# Patient Record
(demographics unavailable — no encounter records)

---

## 2020-12-26 NOTE — NUR
patient remains a and o x 4
sr
lungs cta/dim bases on ra o2 sats mid 90s
uo low today
 called midday and notified
ivf orders given-ns at 50cc/hr
vs stable
no c/o pain
new iv r h 20 ga sl
accuchecks 250/291/255
ssi increased from low to mod per protocal
 notified
up with sba
r groin site c/d/i
small amt of dry bld unchanged from last shift
to tx to tele at end of shift rm 204

## 2020-12-26 NOTE — NUR
PT TRANSPORTED BY NURSING FROM CATH LAB TO ICU . RECEIVED REPORT
BEDSIDE AND ASSUMED CARE AT 0245. PT DENIES COMPLAINTS OF PAIN. ASSESSMENT
COMPLETED AS CHARTED. DISCUSSED PLAN OF CARE WITH PT, VERBALIZED
UNDERSTANDING. ORIENTATED TO ROOM, CALL LIGHT, FALL POLICY.
ASSESSMENT PER PROTOCOL AND PRN TO R GROIN ACCESS SITE. NO SIGN OF HEMATOMA
THROUGHOUT SHIFT
BED LOCKED IN LOWEST POSITION, CALL LIGHT WITHIN REACH, BED ALARM ON.
NURSING ASSISTED WITH POSITION CHANGING TO ENSURE R LEG REMAINED STRAINGHT.
ROUNDING COMPLETED AND ALL NEEDS MET.

## 2020-12-26 NOTE — EKG
[place] [address]
Phone:  [phone]                     ELECTROCARDIOGRAM REPORT      
_______________________________________________________________________________
 
Name:         OMID NEVAREZ              Room:          [address]    ADM IN 
M.R.#:    [id number]     Account #:     [id number]  
Admission:    20    Attend Phys:   Nilo Mathews MD
Discharge:                Date of Birth: 42  
Date of Service: 20 0009  Report #:      [id number]
        [id number]
_______________________________________________________________________________
THIS REPORT FOR:  //name//                      
 
                         [facility] ED
                                       
Test Date:    2020               Test Time:    00:09:54
Pat Name:     OMID NEVAREZ           Department:   
Patient ID:   [id number]            Room:         [facility]
Gender:       M                        Technician:   FL
:          1942               Requested By: Gasper Mix
Order Number: [id number] MD:   Ty Segundo
                                 Measurements
Intervals                              Axis          
Rate:         57                       P:            18
OK:           139                      QRS:          2
QRSD:         85                       T:            98
QT:           420                                    
QTc:          409                                    
                           Interpretive Statements
Sinus rhythm
Inferior infarct, acute (RCA)
Consider anterior infarct
Probable RV involvement, suggest recording right precordial leads
Compared to ECG 2017 18:49:30
No significant changes
Electronically Signed On 2020 12:23:22 CST by Ty Segundo
[email or web address]
 
 
 
 
 
 
 
 
 
 
 
 
 
 
 
 
 
 
  <ELECTRONICALLY SIGNED>
                                           By: Ty Segundo MD, FACC   
  20     1223
D: 20 0009   _____________________________________
T: 20 0009   Ty Segundo MD, FACC     /EPI

## 2020-12-26 NOTE — EKG
[place] [address]
Phone:  [phone]                     ELECTROCARDIOGRAM REPORT      
_______________________________________________________________________________
 
Name:         CARMENHARSHADKEEGAN MOHANY RIMA              Room:          [address]    ADM IN 
M.R.#:    [id number]     Account #:     [id number]  
Admission:    20    Attend Phys:   Nilo Mathews MD
Discharge:                Date of Birth: 42  
Date of Service: 20 0017  Report #:      [id number]
        [id number]
_______________________________________________________________________________
THIS REPORT FOR:  //name//                      
 
                         [facility] ED
                                       
Test Date:    2020               Test Time:    00:17:09
Pat Name:     OMID NEVAREZ           Department:   
Patient ID:   [id number]            Room:         [address]
Gender:       M                        Technician:   FL
:          1942               Requested By: Nilo Mathews
Order Number: [id number]    Reading MD:   Ty Segundo
                                 Measurements
Intervals                              Axis          
Rate:         61                       P:            21
ID:           141                      QRS:          3
QRSD:         83                       T:            113
QT:           406                                    
QTc:          409                                    
                           Interpretive Statements
Sinus rhythm
Inferior infarct, acute (RCA)
Extensive anterior infarct, old
Lateral leads are also involved
Probable RV involvement, suggest recording right precordial leads
Baseline wander in lead(s) V3
Compared to ECG 2020 00:14:23
No significant changes
Electronically Signed On 2020 12:23:34 CST by Ty Segundo
[email or web address]
 
 
 
 
 
 
 
 
 
 
 
 
 
 
 
 
  <ELECTRONICALLY SIGNED>
                                           By: Ty Segundo MD, FACC   
  20     1223
D: 20   _____________________________________
T: 20   Ty Segundo MD, FAC     /EPI

## 2020-12-26 NOTE — EKG
[place] [address]
Phone:  [phone]                     ELECTROCARDIOGRAM REPORT      
_______________________________________________________________________________
 
Name:         CARMENJDOMID T              Room:          [address]    ADM IN 
M.R.#:    [id number]     Account #:     [id number]  
Admission:    20    Attend Phys:   Nilo Mathews MD
Discharge:                Date of Birth: 42  
Date of Service: 20 0014  Report #:      [id number]
        [id number]
_______________________________________________________________________________
THIS REPORT FOR:  //name//                      
 
                         [facility] ED
                                       
Test Date:    2020               Test Time:    00:14:23
Pat Name:     OMID NEVAREZ           Department:   
Patient ID:   [id number]            Room:         [address]
Gender:       M                        Technician:   FL
:          1942               Requested By: Nilo Mathews
Order Number: [id number]    Reading MD:   Ty Segundo
                                 Measurements
Intervals                              Axis          
Rate:         61                       P:            16
FL:           165                      QRS:          0
QRSD:         89                       T:            103
QT:           413                                    
QTc:          416                                    
                           Interpretive Statements
Sinus rhythm
Inferior infarct, acute (RCA)
Consider anterior infarct
Lateral leads are also involved
Probable RV involvement, suggest recording right precordial leads
Compared to ECG 2020 00:09:54
No significant changes
Electronically Signed On 2020 12:23:26 CST by Ty Segundo
[email or web address]
 
 
 
 
 
 
 
 
 
 
 
 
 
 
 
 
 
  <ELECTRONICALLY SIGNED>
                                           By: Ty Segundo MD, FACC   
  20     1223
D: 20 0014   _____________________________________
T: 20 0014   Ty Segundo MD, FACC     /EPI
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
## 2020-12-27 NOTE — NUR
PT. AOX4, R GROIN CATH SITE CDI, NO HEMATOMA NOTED. SR ON MONITOR, VSS, DENIES
PAIN. CALL LIGHT AND PERSONAL BELONGINGS PLACED WITHIN REACH.

## 2020-12-27 NOTE — NUR
No acute event this shift. Pt denies pain. R groin cath site intact.
Assessment as charted, meds given per mar. Call light within reach, safety
precaution in placed.

## 2020-12-28 NOTE — EKG
[place] [address]
Phone:  [phone]                     ELECTROCARDIOGRAM REPORT      
_______________________________________________________________________________
 
Name:         OMID NEVAREZ              Room:          [address]    ADM IN 
M.R.#:    [id number]     Account #:     [id number]  
Admission:    20    Attend Phys:   Nilo Mathews MD
Discharge:                Date of Birth: 42  
Date of Service: 20 [facility]  Report #:      [id number]
        [id number]
_______________________________________________________________________________
THIS REPORT FOR:  //name//                      
 
                          [facility]
                                       
Test Date:    2020               Test Time:    10:38:53
Pat Name:     OMID NEVAREZ           Department:   
Patient ID:   [id number]            Room:         [address]
Gender:       M                        Technician:   JEN
:          1942               Requested By: Nilo Mathews
Order Number: [id number]    Reading MD:   Cisco Waldron
                                 Measurements
Intervals                              Axis          
Rate:         66                       P:            
WA:                                    QRS:          -12
QRSD:         90                       T:            122
QT:           409                                    
QTc:          429                                    
                           Interpretive Statements
Probable sinus rhythm
Inferior infarct, acute (RCA)
Anterior infarct, old
Probable RV involvement, suggest recording right precordial leads
Missing lead(s): V6
Compared to ECG 2020 05:15:50
Myocardial infarct finding still present
Electronically Signed On 2020 15:07:28 CST by Cisco Waldron
[email or web address]
 
 
 
 
 
 
 
 
 
 
 
 
 
 
 
 
 
  <ELECTRONICALLY SIGNED>
                                           By: Cisco Waldron MD, FACC     
  20     1507
D: 20 1038   _____________________________________
T: 20 1038   Cisco Waldron MD, FACC       /EPI

## 2020-12-28 NOTE — NUR
I ASSUMED CARE OF THE PATIENT AT 0700.  HE IS ALERT AND ORIENTED X4 AND IS UP
WITH ASSIST OF 1 AND A GAIT BELT AND A CANE.  HIS CATH SITE IS C/D/I.  HE HAS
BATHROOM RIGHTS AND BLOOD GLUCOSE IS BEING MONITORED.  HE IS CONCERNED ABOUT
HOW TO CHECK HIS SUGAR AT HOME AND WE DISCUSSED HOME SERVICES.  HIS LABS ARE
TRENDING TOWARDS HIS GOALS.  HE WAS GIVEN SOME MEDS TO ASSIST WITH A BOWEL
MOVEMENT, ITS BEEN 4 DAYS.  HE WAS DISCHARGED TO HOME AFTER HIS IV WAS D/C'D
WITH HIS DAUGHTER AT 1530.  SCRIPTS WERE SENT TO [facility].  HE
UNDERSTANDS HIS UPCOMINGS FOLLOW UP APPOINTMENTS.  DISCHARGE WAS EXPLAINED TO
DAUGHTER AND PATIENT.

## 2020-12-28 NOTE — EKG
[place] [address]
Phone:  [phone]                     ELECTROCARDIOGRAM REPORT      
_______________________________________________________________________________
 
Name:         OMID NEVAREZ              Room:          [address]    ADM IN 
M.R.#:    [id number]     Account #:     [id number]  
Admission:    20    Attend Phys:   Nilo Mathews MD
Discharge:                Date of Birth: 42  
Date of Service: 20 0515  Report #:      [id number]
        [id number]
_______________________________________________________________________________
THIS REPORT FOR:  //name//                      
 
                          [facility]
                                       
Test Date:    2020               Test Time:    05:15:50
Pat Name:     OMID NEVAREZ           Department:   
Patient ID:   [id number]            Room:         [address]
Gender:       M                        Technician:   RB
:          1942               Requested By: Nilo Mathews
Order Number: [id number]    Reading MD:   Cisco Waldron
                                 Measurements
Intervals                              Axis          
Rate:         64                       P:            48
WA:           171                      QRS:          -7
QRSD:         88                       T:            119
QT:           426                                    
QTc:          440                                    
                           Interpretive Statements
Sinus rhythm
Inferior infarct, acute (RCA)
Anterior infarct, old
Probable RV involvement, suggest recording right precordial leads
Compared to ECG 2020 05:14:00
No significant changes
Electronically Signed On 2020 15:02:44 CST by Cisco Waldron
[email or web address]
 
 
 
 
 
 
 
 
 
 
 
 
 
 
 
 
 
 
  <ELECTRONICALLY SIGNED>
                                           By: Cisco Waldron MD, FACC     
  20     1502
D: 20   _____________________________________
T: 2015   Cisco Waldron MD, FACC       /EPI

## 2020-12-28 NOTE — EKG
[place] [address]
Phone:  [phone]                     ELECTROCARDIOGRAM REPORT      
_______________________________________________________________________________
 
Name:         OMID NEVAREZ              Room:          [address]    ADM IN 
M.R.#:    [id number]     Account #:     [id number]  
Admission:    20    Attend Phys:   Nilo Mathews MD
Discharge:                Date of Birth: 42  
Date of Service: 20 0514  Report #:      [id number]
        [id number]
_______________________________________________________________________________
THIS REPORT FOR:  //name//                      
 
                          [facility]
                                       
Test Date:    2020               Test Time:    05:14:00
Pat Name:     OMID NEVAREZ           Department:   
Patient ID:   [id number]            Room:         [address]
Gender:       M                        Technician:   RB
:          1942               Requested By: Nilo Mathews
Order Number: [id number]    Reading MD:   Cisco Waldron
                                 Measurements
Intervals                              Axis          
Rate:         67                       P:            19
GA:           164                      QRS:          -4
QRSD:         88                       T:            115
QT:           426                                    
QTc:          450                                    
                           Interpretive Statements
Sinus rhythm
Inferior infarct, acute (RCA)
Anterior infarct, old
Probable RV involvement, suggest recording right precordial leads
Compared to ECG 2020 00:17:09
No significant changes
Electronically Signed On 2020 15:02:38 CST by Cisco Waldron
[email or web address]
 
 
 
 
 
 
 
 
 
 
 
 
 
 
 
 
 
 
  <ELECTRONICALLY SIGNED>
                                           By: Cisco Waldron MD, FACC     
  20     1502
D: 2014   _____________________________________
T: 20 0514   Cisco Waldron MD, FACC       /EPI

## 2020-12-30 NOTE — NUR
CM received call from Pt today, requesting that HH be arranged. Pt opted to
not have HH when he dc on Monday. The only HH agency that services his zip
code and accepts his insurance is [facility] and they do not have the staff to
service his area at this time. CM contacted Pt and let him know, Pt plans to
call and schedule an appt with [facility] instead.

## 2021-01-21 NOTE — NUR
CM SPOKE TO THE PT TO DISCUSS CM ASSESSMENT. PT A&O, NORMALLY INDEPENDENT
WITH ADL'S, AND DRIVES. PT RESIDES AT HOME WITH SPOUSE. PT USES A WALKER OR
CANE FOR MOBILITY. PT HAS PAST HX OF HH. PT HAS 0 HX OF SNF. PT CURRENTLY ON
2L O2, BUT DID NOT USE HOME 02 PRIOR TO ADMIT. CM WILL REMAIN AVAILABLE TO
ASSIST AND FOLLOW FOR D/C PLANNING.

## 2021-01-21 NOTE — NUR
PATIENT SLEPT WELL AFTER RECEIVING LASIX AND PLACED ON O2 AT 2LITERS.  PT
ENCOURAGED TO REPOSITION BUT HE SAID HE IS ABLE TO TURN HIMSELF.  PT DENIES
PAIN/NAUSEA.  PT AFIB ON CARDIAC MONITOR.  FREQUENTLY USED ITEMS AND CALL
LIGHT WITHIN REACH.  SIDERAILS UPX2  WILL CONTINUE TO  MONITOR.

## 2021-01-21 NOTE — NUR
I ASSUMED care of the patient AT 0700.  HE IS ALERT AND ORIENTED X4 AND IS UP
WITH ASSIST OF ONE.  BED IS IN THE LOW LOCKED POSITION AND CALL LIGHT IS IN
REACH.  HOURLY ROUNDING IS COMPLETED AND PATIENT NEEDS ARE MET.  PAIN IS
DENIED.  SLIDING SCALE INSULIN WAS ORDERED.  THORACENTESIS WAS PREFORMED AND
ULTRASOUND SAID THAT ALL LABS WERE SENT TO PATHOLOGY.  COVID PCR WAS ALSO
COLLECTED AND SENT OFF.  HE IS SCHEDULED FOR A CARDIAC CATH TOMORROW.  WILL
CONTINUE TO MONITOR.

## 2021-01-21 NOTE — EKG
[place] [address]
Phone:  [phone]                     ELECTROCARDIOGRAM REPORT      
_______________________________________________________________________________
 
Name:         OMID NEVAREZ              Room:          [address]    ADM IN 
M.R.#:    [id number]     Account #:     [id number]  
Admission:    21    Attend Phys:   Shabbir Escobar, 
Discharge:                Date of Birth: 42  
Date of Service: 21 0424  Report #:      [id number]
        [id number]
_______________________________________________________________________________
THIS REPORT FOR:  //name//                      
 
                          [facility]
                                       
Test Date:    2021               Test Time:    04:24:41
Pat Name:     OMID NEVAREZ           Department:   
Patient ID:   [id number]            Room:         [address]
Gender:       M                        Technician:   UNKNOWN
:          1942               Requested By: Nilo Mathews
Order Number: [id number]    Reading MD:   Nilo Mathews
                                 Measurements
Intervals                              Axis          
Rate:         99                       P:            
MT:                                    QRS:          -15
QRSD:         91                       T:            -82
QT:           378                                    
QTc:          486                                    
                           Interpretive Statements
Atrial fibrillation
 
Inferior infarct, age indeterminate
Anterior infarct, old
Lateral leads are also involved
Compared to ECG 2021 16:45:36
No significant changes
Electronically Signed On 2021 11:20:54 CST by Nilo Mathews
[email or web address]
 
 
 
 
 
 
 
 
 
 
 
 
 
 
 
 
 
  <ELECTRONICALLY SIGNED>
                                           By: Nilo Mathews MD, [facility]      
  21     1120
D: 21 0424   _____________________________________
T: 21 0424   Nilo Mathews MD, [facility]        /EPI

## 2021-01-21 NOTE — E[place] [address]
Phone:  [phone]                     ELECTROCARDIOGRAM REPORT      
_______________________________________________________________________________
 
Name:         OMID NEVAREZ              Room:          [address]    ADM IN 
M.R.#:    [id number]     Account #:     [id number]  
Admission:    21    Attend Phys:   Shabbir Escobar, 
Discharge:                Date of Birth: 42  
Date of Service: 21 1645  Report #:      [id number]
        [id number]
_______________________________________________________________________________
THIS REPORT FOR:  //name//                      
 
                         [facility] ED
                                       
Test Date:    2021               Test Time:    16:45:36
Pat Name:     OMID NEVAREZ           Department:   
Patient ID:   [id number]            Room:         [facility]
Gender:       M                        Technician:   
:          1942               Requested By: Gasper Mix
Order Number: [id number] MD:   Nilo Mathews
                                 Measurements
Intervals                              Axis          
Rate:         90                       P:            
IL:                                    QRS:          -20
QRSD:         91                       T:            -70
QT:           396                                    
QTc:          485                                    
                           Interpretive Statements
Atrial fibrillation
nonspecific t wave changes
Inferior infarct, age indeterminate
Probable anterior infarct, age indeterminate
Compared to ECG 2020 10:38:53
atrial fibrillation now present
Myocardial infarct finding still present
Electronically Signed On 2021 11:17:52 CST by Nilo Mathews
[email or web address]
 
 
 
 
 
 
 
 
 
 
 
 
 
 
 
 
 
  <ELECTRONICALLY SIGNED>
                                           By: Nilo Mathews MD, FACC      
  21     1117
D: 21 1645   _____________________________________
T: 21 1645   Nilo Mathews MD, FAC        /EPI

## 2021-01-22 NOTE — NUR
PT ROUNDING POC UDPATE: PT HAD HEART CATH PLACED TODAY, WILL REMAIN
HOSPITALIZED FOR OBSERVATION.
 
PT WIFE ASKED IF PT WOULD QUALIFY FOR ARU. "I WANT HIM TO GAIN MORE STRENGTH."
RN TO PUT IN ORDER FOR ARU.

## 2021-01-22 NOTE — NUR
CALL FROM [facility].SUPERVISOR AT 9252 TO TRANSFER PT. FAMILY CHOSE [facility] ON THE
[facility]. CM CALLED [facility] TRANSFER TEAM/FABIAN [phone]. GAVE HER
INFORMATION  CELL PHONE NUMBER.  FAXED FACE SHEET TO HER [phone]
(NO INSURANCE CARDS ON FILE). SPOKE WITH [employer] TECH AND ASKED HIM TO UPLOAD
IMAGES TO [facility]. SPOKE WITH JADE/ICU. INFORMED HER OF ABOVE
INFORMATION. SHE SAID WIFE WAS HERE WITH PT. PRACHI,US COPIED CHART AND IS
STARTING AMBULANCE FORM AND EMTALA FORM.

## 2021-01-22 NOTE — NUR
PATIENT ARRIVED TO ICU-8 AT 1725 WITH FLOOR NURSE AND CT TECH AT BEDSIDE.
PATIENT WAS FOUND TO HAVE INCREASED CONFUSION AND SLURRED SPEECH. CT AND RI OF
HEAD SHOWS A BLEED. DR FRAZIER IS AWARE AND HAS BEEN AT BEDSIDE. ORDERS RECIVED
TO COMPLETE FREQUENT NEURO CHECKS AND NOT WORRY ABOUT [facility] AS DR FRAZIER STATES
THAT IT WILL JUST UPSET THE PATIENT AND THE MOST IMPORTANT THING IS HIS NEURO
STATUS. [facility] ATTEMPTED BUT PATIENT WAS UNABLE TO FOLLOW DIRECTIONS. BLOOD
PRESSURE STABLE ON CARDENE. NO FURTHER CONCERNS AT THIS TIME. WILL CONTINUE TO
MONITOR AND CAR PER PLAN OF CARE.

## 2021-01-22 NOTE — EKG
[place] [address]
Phone:  [phone]                     ELECTROCARDIOGRAM REPORT      
_______________________________________________________________________________
 
Name:         OMID NEVAREZ              Room:          [address]    ADM IN 
M.R.#:    [id number]     Account #:     [id number]  
Admission:    21    Attend Phys:   Shabbir Escobar, 
Discharge:                Date of Birth: 42  
Date of Service: 21 0808  Report #:      [id number]
        [id number]
_______________________________________________________________________________
THIS REPORT FOR:  //name//                      
 
                          [facility]
                                       
Test Date:    2021               Test Time:    08:08:37
Pat Name:     OMID NEVAREZ           Department:   
Patient ID:   [id number]            Room:         [address]
Gender:       M                        Technician:   
:          1942               Requested By: Fanny Allen
Order Number: [id number]    Reading MD:   Nilo Mathews
                                 Measurements
Intervals                              Axis          
Rate:         62                       P:            49
MI:           169                      QRS:          -10
QRSD:         85                       T:            -74
QT:           517                                    
QTc:          525                                    
                           Interpretive Statements
Sinus rhythm
Inferior infarct, age indeterminate
Anterior infarct, old
Lateral leads are also involved
Prolonged QT interval
Compared to ECG 2021 04:24:41
Prolonged QT interval now present
Atrial fibrillation no longer present
Myocardial infarct finding still present
Electronically Signed On 2021 14:41:39 CST by Nilo Mathews
[email or web address]
 
 
 
 
 
 
 
 
 
 
 
 
 
 
 
  <ELECTRONICALLY SIGNED>
                                           By: Nilo Mathews MD, [facility]      
  21     1441
D: 21   _____________________________________
T: 21 08   Nilo Mathews MD, [facility]        /EPI

## 2021-01-22 NOTE — NUR
ASSUMED PT CARE AT 0730, PT AOX4 BUT FLAT, NO C/O PAIN OR SHORTNESS OF BREATH.
PT WENT DOWN FOR CARDIAC CATH AT APPROX 0820 AND CAME BACK UP AT APPROX 1050.
PT SLEEPY BUT ORIENTED AT THIS POINT AND WOKE UP PERIODICALLY TO ASK FOR
DRINKS OF WATER AND FOOD. UPON COMING INTO PT ROOM AT APPROX 1540, PT SLURRING
WORDS AND IS UNINTELLIGABLY SPEAKING AND TRYING TO GET OUT OF BED, [facility]
ATTEMPTED AT THIS TIME BUT PT UNCOOPERATIVE, PT MOVING ARMS AND UPPER BODY BUT
WEAK, NO FACIAL DROOP NOTED, DR REID ON THE FLOOR AND ASKED TO COME SEE
PT, SUGGESTED POSSIBLE STROKE, CODE STROKE CALLED, DR BROOKS AND DR SANCHEZ BOTH
PAGED. PT WENT DOWN FOR CT, SEE RESULTS AND THEN FOR MRI, SEE RESULTS.
POSSIBLE BRAIN BLEED NOTED, NEURO WORKED W/ PT AND PT SENT TO ICU AT APPROX
1725. PT WIFE NOTIFIED AT THIS TIME OF UPDATE AND WALKED DOWN TO ICU WAITING
ROOM. MEDS PER MAR, HOURLY ROUNDING OBSERVED, FALL PRECAUTIONS IN PLACE, CALL
LIGHT W/IN REACH.

## 2021-01-22 NOTE — NUR
PT SLEPT FAIRLY WELL OVERNIGHT, USING URINAL INDEP AT BEDSIDE WITH STRICT I&O
RECORDED. AOX4, FLAT AFFECT, IRRITABLE AT TIMES. O2 2L NC. TELE SR. HS
ACCUCHECK 183, REFUSING INSULIN. NPO SINCE MIDNIGHT FOR CARDIAC CATH TODAY.
VSS. ABLE TO USE CALL LITE AND MAKE NEEDS KNOWN. NO SIGNS BLEEDING OR DRAINAGE
FROM L BACK THORACENTESIS SITE. LAC SL, ABX GIVEN AS ORDERED.

## 2021-01-23 NOTE — NUR
PERFORMED BEDSIDE SWALLOW EVALUATION AND PT ABLE TO SWALLOW PILLS. PT WILL
HAVE ST DO EVALUATION AS WELL.

## 2021-01-23 NOTE — NUR
ASSESSMENTS AS CHARTED. PATIENT INTERMITTENTLY AGITATED WHEN TOLD HE CANNOT
GET OUT OF BED AND WANDER AROUND. PATIENT IS CONFUSED, ORINETED TO SELF ONLY.
PATIENT WAS COMBATIVE AROUND MIDNIGHT AFTER ATTEMPTING TO CLIMB OUT OF BED.
HEAD OF BED AT 30 DEGREES OR GREATER. CARDENE INFUSING TO KEEP SBP AROUND 140.

## 2021-01-23 NOTE — NUR
ATTEMPTED BEDSIDE SWALLOW ASSESSMENT. PATIENT COUGHING WHEN ATTEMPTING TO
SWALLOW PUDDING. CONSULT FOR SPEECH THERAPY EVAL PUT IN PER PROTOCOL.

## 2021-01-24 NOTE — NUR
DR. BROOKS INSTRUCTS FOR PT TO STAY IN ICU FOR TODAY. PT'S BP REMAINS ELEVATED
AND IS NOW IN CONTROLLED AFIB. START CARDENE GTT AT LOW DOSE AND WILL TITRATED
TO KEEP SBP LESS THAN 140. WILL CONTINUE TO ASSESS.

## 2021-01-25 NOTE — NUR
PATIENT TRANSFERRED FROM ICU THIS AFTERNOON. REPORT RECEIVED FROM TAD ZAMUDIO.
02 2L NC IN PLACE. BP ELEVATED BUT OTHERWISE STABLE. CARDIAC MONITOR IN PLACE.
NO COMPLAINTS OF PAIN. SCHED IV ABX INFUSING. WIFE AT BEDSIDE.

## 2021-01-25 NOTE — 2DMMODE
[place] [address]
Phone:  [phone] 2 D/M-MODE ECHOCARDIOGRAM     
_______________________________________________________________________________
 
Name:         ROQUEOMID T              Room:          [address] [place] 
LUISA.#:    [id number]     Account #:     [id number]  
Admission:    21    Attend Phys:   Shabbir Escobar, 
Discharge:                Date of Birth: 42  
Date of Service: 21 1440  Report #:      [id number]
        [id number]
_______________________________________________________________________________
THIS REPORT FOR:
 
cc:  Aleksander Corcoran MD, Rene P. MD Holkins,Cisco MENDOZA MD [facility]       
                                                                       ~
 
--------------- APPROVED REPORT --------------
 
 
Study performed:  2021 09:52:54
 
EXAM: Comprehensive 2D, Doppler, and color-flow 
Echocardiogram 
Patient Location: In-Patient   
Room #:  001     Status:  routine
 
      BSA:         2.13
HR: 57 bpm BP:          158/87 mmHg 
Rhythm: NSR     
 
Other Information 
Study Quality: Good
 
Indications
CVA/TIA 
CAD
 
Echo Enhancing Agent
Indication: Rule out Shunt
Agent(s) / Amount(s) Used: Agitated Saline 10 cc
 
2D Dimensions
IVSd:  14.15 (7-11mm) LVOT Diam:  21.75 (18-24mm) 
LVDd:  48.84 mm  
PWd:  12.09 (7-11mm) Ascending Ao:  33.20 (22-36mm)
LVDs:  39.05 (25-40mm) 
Aortic Root:  33.21 mm 
 
Volumes
Left Atrial Volume (Systole) 
    LA ESV Index:  39.10 mL/m2
 
Aortic Valve
AoV Peak Yuri.:  1.33 m/s 
AO Peak Gr.:  7.03 mmHg  LVOT Max P.93 mmHg
 
 
[address] [address]
Phone:  [phone]                     2 D/M-MODE ECHOCARDIOGRAM     
_______________________________________________________________________________
 
Name:         OMID NEVAREZ              Room:          [address]#:    [id number]     Account #:     [id number]  
Admission:    21    Attend Phys:   Shabbir Escobar, 
Discharge:                Date of Birth: 42  
Date of Service: 21 1440  Report #:      [id number]
        [id number]
_______________________________________________________________________________
AO Mean Gr.:  4.05 mmHg  LVOT Mean P.42 mmHg
    LVOT Max V:  0.86 m/s
AO V2 VTI:  28.63 cm  LVOT Mean V:  0.55 m/s
MAURILIO (VTI):  2.28 cm2  LVOT V1 VTI:  17.59 cm
 
Mitral Valve
    E/A Ratio:  1.11
    MV Decel. Time:  196.83 ms
MV E Max Yuri.:  0.75 m/s 
MV PHT:  57.08 ms  
MVA (PHT):  3.85 cm2  
 
TDI
E/Lateral E':  10.71 E/Medial E':  12.50
   Medial E' Yuri.:  0.06 m/s
   Lateral E' Yuri.:  0.07 m/s
 
Pulmonary Valve
PV Peak Yuri.:  0.86 m/s PV Peak Gr.:  2.95 mmHg
 
Tricuspid Valve
    RAP Estimate:  5.00 mmHg
TR Peak Gr.:  28.40 mmHg RVSP:  33.00 mmHg
    PA Pressure:  33.00 mmHg
 
Left Ventricle
The left ventricle is normal size. There is normal LV segmental wall 
motion. Mild to moderate concentric left ventricular hypertrophy. 
Left ventricular systolic function is mildly decreased. LVEF is 
45-50%. The left ventricular diastolic function is normal.
 
Right Ventricle
The right ventricle is normal size. The right ventricular systolic 
function is normal.
 
Atria
Left atrium is mildly dilated. The interatrial septum is intact with 
no evidence for an atrial septal defect. The right atrium size is 
normal.
 
Aortic Valve
Mild aortic valve sclerosis. No aortic regurgitation is present. 
There is no aortic valvular stenosis.
 
Mitral Valve
Mild mitral annular calcification. [place] [address]
Phone:  [phone]                     2 D/M-MODE ECHOCARDIOGRAM     
_______________________________________________________________________________
 
Name:         OMID NEVAREZ              Room:          [address] [place] 
[facility]#:    [id number]     Account #:     [id number]  
Admission:    21    Attend Phys:   Shabbir Escobar, 
Discharge:                Date of Birth: 42  
Date of Service: 21 1440  Report #:      [id number]
        [id number]
_______________________________________________________________________________
evidence of mitral valve stenosis.
 
Tricuspid Valve
The tricuspid valve is normal in structure. Mild tricuspid 
regurgitation. Mild pulmonary hypertension.
 
Pulmonic Valve
The pulmonary valve is normal in structure. There is no pulmonic 
valvular regurgitation.
 
Great Vessels
The aortic root is normal in size. IVC is normal in size and 
collapses >50% with inspiration.
 
Pericardium
There is no pericardial effusion. Left pleural effusion is 
noted.
 
<Conclusion>
The left ventricle is normal size.
Mild to moderate concentric left ventricular hypertrophy.
Left ventricular systolic function is mildly decreased.
LVEF is 45-50%.
The right ventricle is normal size.
Left atrium is mildly dilated.
The right atrium size is normal.
Mild aortic valve sclerosis.
No aortic regurgitation is present.
There is no aortic valvular stenosis.
Mild mitral annular calcification.
Mild mitral regurgitation.
No evidence of mitral valve stenosis.
The tricuspid valve is normal in structure.
Mild tricuspid regurgitation.
Mild pulmonary hypertension.
IVC is normal in size and collapses >50% with inspiration.
There is no pericardial effusion.
There is normal LV segmental wall motion.
The interatrial septum is intact with no evidence for an atrial 
septal defect.
Left pleural effusion is noted.
 
 
 
  <ELECTRONICALLY SIGNED>
                                           By: Cisco Waldron MD, FACC     
  21     1440
D: 21 1440   _____________________________________
T: 21 1440   Cisco Walrdon MD, FACC       /INF

## 2021-01-25 NOTE — EKG
[place] [address]
Phone:  [phone]                     ELECTROCARDIOGRAM REPORT      
_______________________________________________________________________________
 
Name:         OMID NEVAREZ              Room:          [address] [place] 
M.R.#:    [id number]     Account #:     [id number]  
Admission:    21    Attend Phys:   Shabbir Escobar, 
Discharge:                Date of Birth: 42  
Date of Service: 2127  Report #:      [id number]
        [id number]
_______________________________________________________________________________
THIS REPORT FOR:  //name//                      
 
                          [facility]
                                       
Test Date:    2021               Test Time:    08:27:57
Pat Name:     OMID NEVAREZ           Department:   
Patient ID:   [id number]            Room:         [facility]
Gender:       M                        Technician:   
:          1942               Requested By: Nilo Mathews
Order Number: [id number]    Reading MD:   Ty Segundo
                                 Measurements
Intervals                              Axis          
Rate:         59                       P:            0
IN:           176                      QRS:          10
QRSD:         80                       T:            103
QT:           692                                    
QTc:          686                                    
                           Interpretive Statements
Sinus rhythm
Anterior infarct, old
Nonspecific T abnormalities, lateral leads
Prolonged QT interval
Compared to ECG 2021 08:08:37
T-wave abnormality now present
Myocardial infarct finding still present
Electronically Signed On 2021 15:13:18 CST by Ty Segundo
[email or web address]
 
 
 
 
 
 
 
 
 
 
 
 
 
 
 
 
 
  <ELECTRONICALLY SIGNED>
                                           By: Ty Segundo MD, FACC   
  21     1513
D: 21   _____________________________________
T: 21   Ty Segundo MD, FAC     /EPI

## 2021-01-25 NOTE — NUR
STOPPED CARDEM DRIP AFTER HS BP MEDS GIVEN. PRESSURE WAS 'S THEN 130 OR
LESS SYS WHILE ASLEEP. PT DID TAKE OFF O2 A COUPLE TIMES. O2 DESAT TO
MID/UPPER 80'S. QUICKLY RECOVERS TO UPPER 90'S WITH 2L O2 PER NC. TRACING SB
UPPER 50'S ON MONITOR SHILE ASLEEP.  RIGHT GROING DRESSING INTACT WITH OLD
DRAINAGE. NO REPORTS OF PAIN THIS SHIFT.

## 2021-01-26 NOTE — NUR
RECEIVED REPORT. ASSUMED CARE OF PT AROUND 0730. AM ASSESSMENT AND VITALS
COMPLETED AS CHARTED. MEDS PER EMAR. WIFE, PT AND DR DUNN HAD A DISCUSSION
ABOUT POC THIS AM. PT REFUSING TREATMENTS AT TIMES. HERMAN IN PLACE TO DD. UP
TO BEDSIDE CHAIR WITH ASSISTANCE. CARDIAC MONITOR IN PLACE. FALL PRECAUTIONS
IN PLACE. HOURLY ROUNDING PERFORMED. CALL LIGHT WITHIN REACH.

## 2021-01-26 NOTE — NUR
PT ROUNDING POC UPDATE: PT RECENTLY TRANSFERED TO TELE FROM ICU. PT IS ON 2L
OF 02 NC. CM HAVE DISCUSSION AT LENGTH REGARDING POST DISCHARGE PLAN. WIFE
STRONGLY HESITANT ABOUT PT GOING TO SNF AND IS WANTING ARU. PER NASH,
CONSULT HAS BEEN PLACED. PT WIFE IS HOME 24/7 TO PROVIDE SUPERVISION AND CARE.
PT HAS AN SON, BUT IS WOULD NOT BE AVAIL TO "COME OVER TO HELP
EVERYDAY/KAY, WIFE. CM TO CONT TO FOLLOW.

## 2021-01-26 NOTE — PATH
[address]  [address]                    PATHOLOGY RPT PROCEDURE       
_______________________________________________________________________________
 
Name:       OMID NEVAREZ               Room:           [address] [place]  
[facility]#:  [id number]      Account #:      [id number]  
Admission:  01/20/21     Date of Birth:  12/05/42  
Discharge:                             Report #:    [id number]
                                                         Path Case #: [id number]
_______________________________________________________________________________
Note
[facility] Accession Number: [id number]
   TESTS               RESULT  FLAG  UNITS    REF RANGE  LAB
------------------------------------------------------------
   Clinician Provided Cytology Information
   No. of containers..01 Other (Miscellaneous)
Source:                                                   01
   LT PLEURAL FLUID
DIAGNOSIS:                                                02
   LT PLEURAL FLUID
   NEGATIVE FOR MALIGNANT CELLS.
   FEW MESOTHELIAL CELLS AND INFLAMMATORY CELLS.
   THIS INTERPRETATION INCLUDES EVALUATION OF A CELL BLOCK.
Signed out by:                                            02
   Galindo [place] MD, Pathologist
   NPI- [id number]
Performed by:                                             01
   July Mathew, Cytotechnologist ([facility])
Gross description:                                        01
   18ML, YELLOW, 1TP 1CB
   /LCS  01/22/2021  0731 Local
 
------------------------------------------------------------
    FLAG LEGEND:
    L-Low Normal,H-High Normal,LL-Alert Low,HH-Alert High
    <-Panic Low,>-Panic High,A-Abnormal,AA-Critical Abnormal
------------------------------------------------------------
 
Performed at:
[address]
   [place]  [address]
   Balwinder Wilkes MD, Phone: [phone]
[address] [place] [address]
   [address] [place]  [address]
   Galindo Dixon MD, Phone: [phone]
Specimen Comment: A courtesy copy of this report has been sent to [phone]
[phone]
Specimen Comment: [phone], [phone]
Specimen Comment: Report sent to DR FARFAN,DR BROOKS / DR BURKETT
***Performed at:  01
   [address], [place], [place]  [id number]
   MD Balwinder Wilkes MD Phone:  [phone]

## 2021-01-26 NOTE — NUR
TRIED TO GIVE PT MED FOR ELEVATED BP AND SCHEDULED MED AS WELL. PT STATED "I
DON'T WANT ANY MORE MEDS UNTIL I TALK TO THE DOCTOR". MEDS NOT GIVEN.

## 2021-01-27 NOTE — CON
[address]  [address]                    CONSULTATION                  
_______________________________________________________________________________
 
Name:       OMID NEVAREZ               Room:           [address] [place]  
M.R.#:  [id number]      Account #:      [id number]  
Admission:  01/20/21     Attend Phys:    Shabbir Escobar MD 
Discharge:               Date of Birth:  12/05/42  
         Report #: [id number]
                                                                     [id number]  
_______________________________________________________________________________
THIS REPORT FOR:  
 
cc:  Aleksander Corcoran MD, Rene P. MD                                                   ~
     Yimi Hyatt MD          
 
 
HISTORY OF PRESENT ILLNESS:  This is a 78-year-old male patient who was
evaluated by me for the possibility of stroke.  Initially, I talked to Dr. Mathews
and subsequently I had more discussions with him after the CT and MRI report was
available.  I talked to Dr. Ren who is the hospitalist, who is making rounds
on this patient.  I talked to the nurse who was looking at this patient on the
floor and I will talk to the nurse who is in ICU and presently in the report,
but I will talk to her before I leave ICU.  Because of time sensitive and
acuteness of the patient's problem, this note is being dictated and we will
supplement it with later on and its a concise note.
 
This patient went for cardiac catheterization and had a stent put in.  He was
apparently doing okay after the stent, but then the nurses noticed about 45
minutes prior to activating the code stroke that the patient was confused, he
was not talking and he was saying only yes and no.  They said they noticed
right-sided weakness, but I am not certain about that.  I had multiple
discussions with the patient's wife after and before seeing the patient because
the patient was in CT.
 
As I understand from Dr. Mathews, the patient has been on Plavix and he also was
noticed to be in atrial fibrillation, but he is not on anticoagulation at the
moment.  He does have a history of heart failure, hypertension, apparently TIA,
but I am not sure about that history.  He also has a history of diabetes and
kidney stones.
 
REVIEW OF SYSTEMS:  I reviewed those notes and in fact I had seen this patient
at that time in 2017.  Rest of the review of systems will be carried out later
on, although I glanced through the records I could.
 
PAST MEDICAL HISTORY:  Positive for meningitis and it does look like he did have
meningitis at that time.  When he was admitted this time, his renal function was
abnormal.  His creatinine still is 1.7 and that is where it has been running. 
He got contrast during his procedure today.  His examination was limited.  He
basically said that yes or no.  He will not follow any commands for me, but I
saw him moving his right arm as well as the left arm.  He would not do anything
on commands.  He had become confused; therefore examination was difficult as he
had gone down for CT as a CT stroke protocol.  
 
Me and Dr. Mathews discussed what the best procedure to do on him is.  If I
understood correctly, the patient was still within TPA window, so we evaluated
 
 
 
[place] [address]                    CONSULTATION                  
_______________________________________________________________________________
 
Name:       ROQUEOMID T               Room:           [address] [place]  
.R.#:  [id number]      Account #:      [id number]  
Admission:  01/20/21     Attend Phys:    Shabbir Escobar MD 
Discharge:               Date of Birth:  12/05/42  
         Report #: [id number]
                                                                     [id number]  
_______________________________________________________________________________
 
 
him for that.  We were considering whether to do a CT angio, but his kidney
function is not very well and he will need contrast and if he has an embolus,
then he will need another contrast for another CAT.  The other option is doing
an MRI and MRA in this patient.  He did have a stent put in.  I discussed with
Dr. Mathews and he felt comfortable proceeding with MRI.  I talked to radiologist,
Dr. Klein and he felt comfortable proceeding with MRI even with the recent stent
and I talked to the MRI Department and they are pretty read up on that too and
they also said it is okay to proceed with MRI.  Even then, I talked to the
patient's wife and I told her very clearly that there are definite risks with it
and I discussed her options with her and she wanted to proceed with MRI even if
he had a stent and she understood the risks very well.  I tried to explain to
the patient I do not think the patient understands things.  After doing all
this, we did a quick MRI in this patient, but as we were taking him down for
MRI, CT report came and it showed the patient had a hemorrhage.  Hemorrhage is
rather superficial and it is a small hemorrhage.  I still went ahead and did an
MRA on him and that does not appear to be showing any aneurysm, but there are
some segmental narrowing.  We also did an MRI, which showed a possibility of
stroke in addition to hemorrhage.
 
Situation is as complex as it can get in this patient.  He has a bleed.  For
bleed, we need to lower his blood pressure to 140 systolic or less.  He did get
heparin and we need to get a stat PTT in this patient, which has been ordered. 
If PTT is high, we need to reverse the heparin with protamine.  Dr. Ren is
already in the process of arranging that.  The patient needs a repeat CT in 6
hours and another one in 24 hours to make sure that the bleed is not becoming
bigger.  Ideally, this patient should be transferred to another facility.  Wife
will not go to [place], which is connected to this hospital.  She wants to go
to [facility].  Because of COVID, hospitals are full and we had difficult time
transferring such patients in the past and that may be problem again, but Dr. Ren is going to see if that is possible.  The other problem in this patient
is that he has to be without any antithrombotic therapy until his bleed
resolved, that is typically 2 weeks.  Dr. Mathews thinks his stent will clot if we
do not put him on any antithrombotic therapy.  I do not think he should be on
any antithrombotic therapy for the first 24 hours at least because that is the
biggest time when the bleed becomes bigger.  The usual recommendation is no
antithrombotic therapy for 2 weeks, but if his stent is going to clot and that
is going to cause of his problem, then we can try some antiplatelet therapy
after 24 hours.
 
The patient needs to be in the ICU.  The patient needs to be monitored closely
and especially his blood pressure needs to be lowered.
 
I had a long discussion with this patient's wife after all these tests were done
and multiple discussions with her before that and she understands a very
 
 
 
[place] [address]                    CONSULTATION                  
_______________________________________________________________________________
 
Name:       OMID NEVAREZ               Room:           [address] [place]  
..#:  [id number]      Account #:      [id number]  
Admission:  01/20/21     Attend Phys:    Shabbir Escobar MD 
Discharge:               Date of Birth:  12/05/42  
         Report #: [id number]
                                                                     [id number]  
_______________________________________________________________________________
 
 
difficult situation in this patient and the fact that is unfortunately no good
option in this patient.  She wants to proceed with the plan outlined above and
we will follow this plan and I will continue to follow up with this patient.  I
have spent more than 70 minutes of the time taking care of this patient and
since my first meeting with Dr. Mathews, the cardiologist and has discussed this
patient with all the above consultants and I will continue to follow this
patient if he is here.
 
 
 
 
 
 
 
 
 
 
 
 
 
 
 
 
 
 
 
 
 
 
 
 
 
 
 
 
 
 
 
 
 
 
 
<ELECTRONICALLY SIGNED>
                                        By:  Yimi Hyatt MD         
01/27/21     1232
D: 01/22/21 1801_______________________________________
T: 01/22/21 1843Parrosalind Hyatt MD            /nt

## 2021-01-27 NOTE — NUR
JENS INFORMED DURING PRIME ROUNDING OF THE PLAN OF CARE FOR THE PT. INPT REHAB
CONSULT PENDING. HOWEVER PT WALKED WITH P.T. YESTERDAY 320 FT. JENS RECIEVED
CALL FROM [facility] (DEBBIE.O.N. @ [place]) AND SHE INFORMS OF ABILITY TO ACCEPT PT
TODAY TO SNF. JENS INFORMED PHYSICIAN AND INPT REHAB OF THIS INFO. CM WILL
REMAIN AVAILABLE TO ASSIST AND FOLLOW AS NEEDED.

## 2021-01-27 NOTE — NUR
ASSUMED CARE IF PATIENT THIS AM AT 0730.  PATIENT IS ALERT AND ORIENTED X 4.
HIS AFFECT IS SOMEWHAT SULLEN BUT PATIENT IS COOPERATIVE.  PATIENT ASSISTED UP
TO THE CHAIR THIS AFTERNOON.  PLANS TO DISCHARGE PATIENT TO A SNF OR REHAB.
TELE SHOWS SR TO SB.  WILL CONTINUE TO MONITOR.  NO FALLS OR INJURY.

## 2021-01-27 NOTE — EEG
[address]  [address]                    EEG STUDY REPORT              
_______________________________________________________________________________
 
Name:       OMID NEVAREZ               Room:           [address] [place]  
.R.#:  [id number]      Account #:      [id number]  
Admission:  01/20/21     Attend Phys:    Shabbir Escobar MD 
Discharge:               Date of Birth:  12/05/42  
         Report #: [id number]
                                                                     [id number]  
_______________________________________________________________________________
THIS REPORT FOR:  
 
cc:  Aleksander Corcoran MD, Rene P. MD                                                   ~
     Yimi Hyatt MD          
ELECTROENCEPHALOGRAM
 
This patient's EEG was done because of the patient's altered mental status.  EEG
was done by placing the electrode by standard 10-20 system of electrode
placement.  Both referential and sequential montages were used for recording. 
Background activity does appear to be about 8 Hz and 30 microvolt.  It is
intermixed with theta range slowing on both sides.  The patient became drowsy
and went to sleep and that is associated with bilateral slowing and vertex sharp
waves.  Throughout the record, no active epileptiform activity was noted.
 
IMPRESSION:  Moderately abnormal EEG because it is disorganized and poorly
formed.  That is a nonspecific abnormality, which can occur with dementia,
encephalopathy, effect of psychotropic medication, etc.  Clinical correlation is
recommended.
 
 
 
 
 
 
 
 
 
 
 
 
 
 
 
 
 
 
 
 
 
 
 
 
 
<ELECTRONICALLY SIGNED>
                                        By:  Yimi Hyatt MD         
01/27/21     1232
D: 01/23/21 1233_______________________________________
T: 01/23/21 1309Yimi Hyatt MD            /nt

## 2021-01-27 NOTE — NUR
PT IS ABLE TO COMMUNICATE HIS NEEDS TO STAFF EFFECTIVELY. HE HAS DENIED THE
NEED FOR PAIN MEDICATION UP TO THIS TIME. REHAB CONSULTED. VIRGIL HAS BEEN
PATENT UP TO THIS TIME.

## 2021-01-28 NOTE — EKG
[place] [address]
Phone:  [phone]                     ELECTROCARDIOGRAM REPORT      
_______________________________________________________________________________
 
Name:         OMID NEVAREZ              Room:          [address]    ADM [place] 
M.R.#:    [id number]     Account #:     [id number]  
Admission:    21    Attend Phys:   Shabbir Escobar, 
Discharge:                Date of Birth: 42  
Date of Service: 21 1203  Report #:      [id number]
        [id number]
_______________________________________________________________________________
THIS REPORT FOR:  //name//                      
 
                          [facility]
                                       
Test Date:    2021               Test Time:    12:03:14
Pat Name:     OMID NEVAREZ           Department:   
Patient ID:   [id number]            Room:         [address]
Gender:       M                        Technician:   
:          1942               Requested By: Fanny Allen
Order Number: [id number]    Anshul MD:   Ty Segundo
                                 Measurements
Intervals                              Axis          
Rate:         54                       P:            12
ID:           [id number]                      QRS:          -18
QRSD:         92                       T:            185
QT:           533                                    
QTc:          506                                    
                           Interpretive Statements
Sinus rhythm
Inferior infarct, old
Probable anterior infarct, age indeterminate
Prolonged QT interval
Compared to ECG 2021 08:27:57
T-wave abnormality no longer present
Myocardial infarct finding still present
Electronically Signed On 2021 16:03:26 CST by Ty Segundo
[email or web address]
 
 
 
 
 
 
 
 
 
 
 
 
 
 
 
 
 
  <ELECTRONICALLY SIGNED>
                                           By: Ty Segundo MD, FACC   
  21     1603
D: 21 1203   _____________________________________
T: 21 1203   Ty Segundo MD, FACC     /EPI

## 2021-01-28 NOTE — NUR
PT IS ABLE TO COMMUNICATE HIS NEEDS TO STAFF EFFECTIVELY. HE HAS DENIED THE
NEED FOR PAIN MEDICATION UP TO 0700 THIS MORNING. HERMAN HAS BEEN PATENT DURING
NIGHT SHIFT. POSSIBLE DISCHARGE TO REHAB OR SNIF LATER TODAY.

## 2021-01-28 NOTE — NUR
CM INFORMED DURING PRIME ROUNDING OF THE PLAN OF CARE FOR THE PT. IN ARU
LIAISON INFORMS THAT THE PT HAS BEEN DECLINED FOR INPT ARU AND RECOMMENDING
SNF PLACEMENT FOR PT. CM SPOKE TO THE PT AND SPOUSE TO DISCUSS THIS AND THEY
REQUEST SNF PLACEMENT WITH #1 [facility] OR #2 [facility]. CM SPOKE TO
ADMISSIONS WITH [facility] AND THEY INFORM THAT THEY ARE OUT-OF-NETWORK
WITH THE PT'S INSURANCE. CM SPOKE TO [facility] D.O.N. AND SHE CONFIRMS ABILITY
TO ACCEPT PT. HOWEVER IS UNABLE TO ACCEPT THE PT TODAY, AS THEY ALREADY HAVE 3
NEW ADMISSIONS TODAY. RENETTA ALSO REQUEST PT'S MEDICARE NUMBER. CM SPOKE TO THE
PT AND SPOUSE TO INFORM OF ALL OF THE ABOVE INFO. PT AND SPOUSE IN AGREEMENT
AND ACCEPT PLACEMENT AT [facility] AND PT'S SPOUSE WILL PROVIDE PT'S
MEDICARE NUMBER TO [facility]. PLAN FOR PT TO D/C TO [facility] TOMORROW. CM
WILL REMAIN AVAILABLE TO ASSIST AND FOLLOW AS NEEDED.
 
[facility]  PHONE: [phone]   FAX: [phone]

## 2021-01-29 NOTE — NUR
VS AS CHARTED, SB ON TELE, ROOM AIR, UP WITH ONE, HOURLY ROUNDING PERFORMED,
POSSESSIONS AND CALL LIGHT WITHIN REACH, REC DISCHARGE ORDERS, REVIEWED WITH
PATIENT AND SPOUSE, TELE MONITOR AND IV REMOVED WITHOUT COMPLICATION, PATIENT
TAKEN IN WHEELCHAIR BY NURSING STAFF, PICKED UP IN FAMILY CAR BY SPOUSE.

## 2021-01-29 NOTE — NUR
CM INFORMED DURING PRIME ROUNDING OF THE PLAN OF CARE FOR THE PT. CM INFORMED
BY [facility] ADMISSIONS [facility] THAT PT IS 'TOO HIGH FUNCTIONING FOR SNF PER
INSURANCE'. CM SPOKE TO PHYSISICIAN AND RN TO INFORM OF THIS. CM SPOKE TO PT
AND SPOUSE TO INFORM OF THIS AS WELL AND CONFIRMED HOME WITH HH AS D/C PLAN.
PT AND SPOUSE IN AGREEMENT. PT AND SPOUSE CHOSE [facility]. CM CALLED AND
FAXED PT'S CLINICAL INFO AND D/C ORDERS TO [facility]. [facility] WILL CONTACT PT
TO ARRANGE A TIME TO VISIT. CM WILL REMAIN AVAILABLE TO ASSIST AND FOLLOW AS
NEEDED.
 
[facility]  PHONE: [phone]   FAX: [phone]

## 2021-06-13 NOTE — EKG
[place] [address]
Phone:  [phone]                     ELECTROCARDIOGRAM REPORT      
_______________________________________________________________________________
 
Name:         OMID NEVAREZ              Room:                     [facility]#:    [id number]     Account #:     [id number]  
Admission:    21    Attend Phys:                     
Discharge:    21    Date of Birth: 42  
Date of Service: 210  Report #:      [id number]
        [id number]
_______________________________________________________________________________
THIS REPORT FOR:  //name//                      
 
                         [facility] ED
                                       
Test Date:    2021               Test Time:    18:50:53
Pat Name:     OMID NEVAREZ           Department:   
Patient ID:   [id number]            Room:          
Gender:                               Technician:    STUDENT
:          1942               Requested By: Gasper Mix
Order Number: [id number] MD:   Ty Segundo
                                 Measurements
Intervals                              Axis          
Rate:         65                       P:            -7
OR:           186                      QRS:          -8
QRSD:         93                       T:            263
QT:           477                                    
QTc:          496                                    
                           Interpretive Statements
Sinus arrhythmia
Nonspecific T abnormalities, diffuse leads
Borderline prolonged QT interval
Inferior infarct, old
Compared to ECG 2021 12:03:14
No significant changes noted
Electronically Signed On 2021 14:19:49 CDT by Ty Segundo
[email or web address]
 
 
 
 
 
 
 
 
 
 
 
 
 
 
 
 
 
 
  <ELECTRONICALLY SIGNED>
                                           By: Ty Segundo MD, FACC   
  21     1419
D: 21 1850   _____________________________________
T: 21 1850   Ty Segundo MD, FAC     /EPI
